# Patient Record
Sex: FEMALE | Race: WHITE | NOT HISPANIC OR LATINO
[De-identification: names, ages, dates, MRNs, and addresses within clinical notes are randomized per-mention and may not be internally consistent; named-entity substitution may affect disease eponyms.]

---

## 2020-06-30 ENCOUNTER — FORM ENCOUNTER (OUTPATIENT)
Age: 50
End: 2020-06-30

## 2020-08-19 ENCOUNTER — FORM ENCOUNTER (OUTPATIENT)
Age: 50
End: 2020-08-19

## 2021-05-10 ENCOUNTER — RX RENEWAL (OUTPATIENT)
Age: 51
End: 2021-05-10

## 2021-05-10 PROBLEM — Z00.00 ENCOUNTER FOR PREVENTIVE HEALTH EXAMINATION: Status: ACTIVE | Noted: 2021-05-10

## 2021-07-14 ENCOUNTER — APPOINTMENT (OUTPATIENT)
Dept: BREAST CENTER | Facility: CLINIC | Age: 51
End: 2021-07-14

## 2021-08-02 DIAGNOSIS — Z85.3 PERSONAL HISTORY OF MALIGNANT NEOPLASM OF BREAST: ICD-10-CM

## 2021-08-02 DIAGNOSIS — Z78.9 OTHER SPECIFIED HEALTH STATUS: ICD-10-CM

## 2021-08-02 DIAGNOSIS — Z80.3 FAMILY HISTORY OF MALIGNANT NEOPLASM OF BREAST: ICD-10-CM

## 2021-08-02 RX ORDER — TAMOXIFEN CITRATE 20 MG/1
20 TABLET, FILM COATED ORAL
Refills: 0 | Status: ACTIVE | COMMUNITY

## 2021-08-04 ENCOUNTER — APPOINTMENT (OUTPATIENT)
Dept: BREAST CENTER | Facility: CLINIC | Age: 51
End: 2021-08-04
Payer: COMMERCIAL

## 2021-08-04 VITALS
SYSTOLIC BLOOD PRESSURE: 136 MMHG | WEIGHT: 165 LBS | HEIGHT: 65 IN | BODY MASS INDEX: 27.49 KG/M2 | DIASTOLIC BLOOD PRESSURE: 89 MMHG | HEART RATE: 82 BPM

## 2021-08-04 DIAGNOSIS — Z72.89 OTHER PROBLEMS RELATED TO LIFESTYLE: ICD-10-CM

## 2021-08-04 PROCEDURE — 99213 OFFICE O/P EST LOW 20 MIN: CPT

## 2021-08-04 NOTE — HISTORY OF PRESENT ILLNESS
[FreeTextEntry1] : The patient is a 51-year-old G2, P1 postmenopausal white female of [place] descent. She underwent menarche at age 13 and had her first child at age 39. She has a strong family history of breast cancer with her mother who had breast cancer in her 50s and her maternal great aunt had breast cancer in her 60s. Her paternal grandfather had stomach cancer. The patient was found to have suspicious calcifications in the upper outer aspect of the left breast back in 2011 and underwent a biopsy showing DCIS. 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 The cancer was ER/CA positive and HER-2/gary negative making this a pathologic prognostic stage IA breast cancer. She underwent intraoperative and postoperative radiation and was placed on tamoxifen. She did not require chemotherapy. She underwent BRCA testing in 2011 and was BRCA negative but did not have NAVEEN testing. She comes in for routine follow-up and continues to get yearly mammography and ultrasound.

## 2021-08-04 NOTE — PHYSICAL EXAM
[Normocephalic] : normocephalic [Atraumatic] : atraumatic [EOMI] : extra ocular movement intact [Supple] : supple [No Supraclavicular Adenopathy] : no supraclavicular adenopathy [No Cervical Adenopathy] : no cervical adenopathy [Examined in the supine and seated position] : examined in the supine and seated position [No dominant masses] : no dominant masses in right breast  [No dominant masses] : no dominant masses left breast [No Nipple Retraction] : no left nipple retraction [No Nipple Discharge] : no left nipple discharge [Breast Mass Right Breast ___cm] : no masses [Breast Mass Left Breast ___cm] : no masses [Breast Nipple Inversion] : nipples not inverted [Breast Nipple Retraction] : nipples not retracted [Breast Nipple Fissures] : nipples not fissured [Breast Nipple Flattening] : nipples not flattened [Breast Abnormal Lactation (Galactorrhea)] : no galactorrhea [Breast Abnormal Secretion Bloody Fluid] : no bloody discharge [Breast Abnormal Secretion Serous Fluid] : no serous discharge [Breast Abnormal Secretion Opalescent Fluid] : no milky discharge [No Axillary Lymphadenopathy] : no left axillary lymphadenopathy [No Edema] : no edema [No Rashes] : no rashes [No Ulceration] : no ulceration [de-identified] : On exam, the patient has full C-cup breasts.  She has a well-healed wide excision scar in the upper outer aspect of the left breast.  On palpation, she has typical postop scarring changes secondary to intraoperative and external beam radiation in the left breast but no evidence of recurrence.  She has no suspicious masses in the right breast.  She has no axillary, supraclavicular, or cervical adenopathy. [de-identified] : Status post partial mastectomy and intraoperative and postoperative radiation with no evidence of recurrence [de-identified] : Left breast upper outer quadrant scarring

## 2021-08-04 NOTE — PAST MEDICAL HISTORY
[Postmenopausal] : The patient is postmenopausal [Menarche Age ____] : age at menarche was [unfilled] [Total Preg ___] : G[unfilled] [Live Births ___] : P[unfilled]  [Age At Live Birth ___] : Age at live birth: [unfilled] [Menopause Age____] : age at menopause was [unfilled]

## 2022-03-01 ENCOUNTER — RX RENEWAL (OUTPATIENT)
Age: 52
End: 2022-03-01

## 2022-07-30 ENCOUNTER — TRANSCRIPTION ENCOUNTER (OUTPATIENT)
Age: 52
End: 2022-07-30

## 2022-09-14 ENCOUNTER — NON-APPOINTMENT (OUTPATIENT)
Age: 52
End: 2022-09-14

## 2022-09-14 ENCOUNTER — RESULT REVIEW (OUTPATIENT)
Age: 52
End: 2022-09-14

## 2022-09-15 ENCOUNTER — APPOINTMENT (OUTPATIENT)
Dept: BREAST CENTER | Facility: CLINIC | Age: 52
End: 2022-09-15

## 2022-09-15 VITALS
HEART RATE: 56 BPM | OXYGEN SATURATION: 96 % | WEIGHT: 158 LBS | SYSTOLIC BLOOD PRESSURE: 127 MMHG | DIASTOLIC BLOOD PRESSURE: 74 MMHG | BODY MASS INDEX: 26.29 KG/M2

## 2022-09-15 DIAGNOSIS — R92.2 INCONCLUSIVE MAMMOGRAM: ICD-10-CM

## 2022-09-15 PROCEDURE — 99213 OFFICE O/P EST LOW 20 MIN: CPT

## 2022-09-15 NOTE — ASSESSMENT
[FreeTextEntry1] : The patient is a 52-year-old G2, P1 postmenopausal white female of [place] descent. She underwent menarche at age 13 and had her first child at age 39. She has a strong family history of breast cancer with her mother who had breast cancer in her 50s and her maternal great aunt had breast cancer in her 60s. Her paternal grandfather had stomach cancer. The patient was found to have suspicious calcifications in the upper outer aspect of the left breast back in 2011 and underwent a biopsy showing DCIS. 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 The cancer was ER/[place] positive and HER-2/gary negative making this a pathologic prognostic stage IA breast cancer. She underwent intraoperative and postoperative radiation and was placed on tamoxifen. She did not require chemotherapy. She underwent BRCA testing in 2011 and was BRCA negative but did not have NAVEEN testing.  On exam today, I cannot feel any evidence of recurrence in the left breast.  She underwent her last bilateral mammography and ultrasound which was reviewed from today on September 15, 2022 performed here at [facility] which just showed postsurgical changes in the upper outer aspect of the left breast but I am awaiting the official radiology report.  She was reassured and should follow-up again in 1 year and will be due for her next bilateral mammography and ultrasound in September 2023 and she was given prescriptions.  The patient could be considered for genetic up testing.  She stopped the tamoxifen after 10 years of treatment.  Her gynecologist is considering putting her on Evista and I have no problem with this.

## 2022-09-15 NOTE — REASON FOR VISIT
[Follow-Up: _____] : a [unfilled] follow-up visit [FreeTextEntry1] : The patient comes in with a family history of breast cancer and a personal history of a left breast upper outer quadrant 5 mm moderately differentiated invasive duct cancer diagnosed in the end of December 2011 for which she underwent a left breast partial mastectomy on December 5, 2011 and had 4 negative sentinel lymph nodes and the cancer was ER/[place] positive HER-2/gary negative making this a pathologic prognostic stage IA breast cancer. She underwent intraoperative radiation followed by external beam radiation and was placed on tamoxifen. She did not require chemotherapy. She comes in for routine follow-up.

## 2022-09-15 NOTE — HISTORY OF PRESENT ILLNESS
[FreeTextEntry1] : The patient is a 52-year-old G2, P1 postmenopausal white female of [place] descent. She underwent menarche at age 13 and had her first child at age 39. She has a strong family history of breast cancer with her mother who had breast cancer in her 50s and her maternal great aunt had breast cancer in her 60s. Her paternal grandfather had stomach cancer. The patient was found to have suspicious calcifications in the upper outer aspect of the left breast back in 2011 and underwent a biopsy showing DCIS. 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 The cancer was ER/NM positive and HER-2/gary negative making this a pathologic prognostic stage IA breast cancer. She underwent intraoperative and postoperative radiation and was placed on tamoxifen. She did not require chemotherapy. She underwent BRCA testing in 2011 and was BRCA negative but did not have NAVEEN testing. She comes in for routine follow-up and continues to get yearly mammography and ultrasound.

## 2022-09-15 NOTE — PHYSICAL EXAM
[Normocephalic] : normocephalic [Atraumatic] : atraumatic [EOMI] : extra ocular movement intact [Supple] : supple [No Supraclavicular Adenopathy] : no supraclavicular adenopathy [No Cervical Adenopathy] : no cervical adenopathy [Examined in the supine and seated position] : examined in the supine and seated position [No dominant masses] : no dominant masses in right breast  [No dominant masses] : no dominant masses left breast [No Nipple Retraction] : no left nipple retraction [No Nipple Discharge] : no left nipple discharge [Breast Mass Right Breast ___cm] : no masses [Breast Mass Left Breast ___cm] : no masses [No Axillary Lymphadenopathy] : no left axillary lymphadenopathy [No Edema] : no edema [No Rashes] : no rashes [No Ulceration] : no ulceration [Breast Nipple Inversion] : nipples not inverted [Breast Nipple Retraction] : nipples not retracted [Breast Nipple Flattening] : nipples not flattened [Breast Nipple Fissures] : nipples not fissured [Breast Abnormal Lactation (Galactorrhea)] : no galactorrhea [Breast Abnormal Secretion Bloody Fluid] : no bloody discharge [Breast Abnormal Secretion Serous Fluid] : no serous discharge [Breast Abnormal Secretion Opalescent Fluid] : no milky discharge [de-identified] : On exam, the patient has full C-cup breasts.  She has a well-healed wide excision scar in the upper outer aspect of the left breast.  On palpation, she has typical postop scarring changes secondary to intraoperative and external beam radiation in the left breast but no evidence of recurrence.  She has no suspicious masses in the right breast.  She has no axillary, supraclavicular, or cervical adenopathy. [de-identified] : Status post partial mastectomy and intraoperative and postoperative radiation with no evidence of recurrence [de-identified] : Left breast upper outer quadrant scarring

## 2022-09-19 ENCOUNTER — NON-APPOINTMENT (OUTPATIENT)
Age: 52
End: 2022-09-19

## 2022-11-30 ENCOUNTER — RX RENEWAL (OUTPATIENT)
Age: 52
End: 2022-11-30

## 2022-11-30 RX ORDER — TAMOXIFEN CITRATE 20 MG/1
20 TABLET, FILM COATED ORAL
Qty: 90 | Refills: 2 | Status: ACTIVE | COMMUNITY
Start: 2021-05-10 | End: 1900-01-01

## 2023-10-12 ENCOUNTER — RESULT REVIEW (OUTPATIENT)
Age: 53
End: 2023-10-12

## 2023-10-19 ENCOUNTER — APPOINTMENT (OUTPATIENT)
Dept: BREAST CENTER | Facility: CLINIC | Age: 53
End: 2023-10-19
Payer: COMMERCIAL

## 2023-10-19 VITALS
BODY MASS INDEX: 27.46 KG/M2 | DIASTOLIC BLOOD PRESSURE: 78 MMHG | OXYGEN SATURATION: 97 % | WEIGHT: 165 LBS | HEART RATE: 74 BPM | SYSTOLIC BLOOD PRESSURE: 131 MMHG

## 2023-10-19 DIAGNOSIS — Z85.3 PERSONAL HISTORY OF MALIGNANT NEOPLASM OF BREAST: ICD-10-CM

## 2023-10-19 DIAGNOSIS — Z90.12 ACQUIRED ABSENCE OF LEFT BREAST AND NIPPLE: ICD-10-CM

## 2023-10-19 DIAGNOSIS — Z80.3 FAMILY HISTORY OF MALIGNANT NEOPLASM OF BREAST: ICD-10-CM

## 2023-10-19 PROCEDURE — 99213 OFFICE O/P EST LOW 20 MIN: CPT

## 2023-12-17 ENCOUNTER — NON-APPOINTMENT (OUTPATIENT)
Age: 53
End: 2023-12-17

## 2024-07-20 ENCOUNTER — NON-APPOINTMENT (OUTPATIENT)
Age: 54
End: 2024-07-20

## 2024-09-25 ENCOUNTER — NON-APPOINTMENT (OUTPATIENT)
Age: 54
End: 2024-09-25

## 2024-09-25 NOTE — PAST MEDICAL HISTORY
Patient oriented x3, forgetful + drowsy at times. Family at bedside. VSS, afebrile this shift. Heparin on hold. Tolerating diet, no nausea. - BM today. Voiding in purewick, depends changed due to leaking. Fentanyl patch on R arm. 1x dose of oral dilaudid given. IV zosyn started, electrolyte replacement. R port. Patient up with 1 assist and RW. Fall precautions in place. Bed locked and in lowest position. Call light within reach. Plan for MRI lumbar possibly tonight, will need xanax prior. Problem: Patient Centered Care  Goal: Patient preferences are identified and integrated in the patient's plan of care  Description: Interventions:  - What would you like us to know as we care for you?   - Provide timely, complete, and accurate information to patient/family  - Incorporate patient and family knowledge, values, beliefs, and cultural backgrounds into the planning and delivery of care  - Encourage patient/family to participate in care and decision-making at the level they choose  - Honor patient and family perspectives and choices  Outcome: Progressing     Problem: Patient/Family Goals  Goal: Patient/Family Long Term Goal  Description: Patient's Long Term Goal:     Interventions:  -   - See additional Care Plan goals for specific interventions  Outcome: Progressing  Goal: Patient/Family Short Term Goal  Description: Patient's Short Term Goal:     Interventions:   -   - See additional Care Plan goals for specific interventions  Outcome: Progressing     Problem: SAFETY ADULT - FALL  Goal: Free from fall injury  Description: INTERVENTIONS:  - Assess pt frequently for physical needs  - Identify cognitive and physical deficits and behaviors that affect risk of falls.   - [place] fall precautions as indicated by assessment.  - Educate pt/family on patient safety including physical limitations  - Instruct pt to call for assistance with activity based on assessment  - Modify environment to reduce risk of injury  - Provide assistive devices as appropriate  - Consider OT/PT consult to assist with strengthening/mobility  - Encourage toileting schedule  Outcome: Progressing     Problem: DISCHARGE PLANNING  Goal: Discharge to home or other facility with appropriate resources  Description: INTERVENTIONS:  - Identify barriers to discharge w/pt and caregiver  - Include patient/family/discharge partner in discharge planning  - Arrange for needed discharge resources and transportation as appropriate  - Identify discharge learning needs (meds, wound care, etc)  - Arrange for interpreters to assist at discharge as needed  - Consider post-discharge preferences of patient/family/discharge partner  - Complete POLST form as appropriate  - Assess patient's ability to be responsible for managing their own health  - Refer to Case Management Department for coordinating discharge planning if the patient needs post-hospital services based on physician/LIP order or complex needs related to functional status, cognitive ability or social support system  Outcome: Progressing     Problem: SKIN/TISSUE INTEGRITY - ADULT  Goal: Skin integrity remains intact  Description: INTERVENTIONS  - Assess and document risk factors for pressure ulcer development  - Assess and document skin integrity  - Monitor for areas of redness and/or skin breakdown  - Initiate interventions, skin care algorithm/standards of care as needed  Outcome: Progressing  Goal: Incision(s), wounds(s) or drain site(s) healing without S/S of infection  Description: INTERVENTIONS:  - Assess and document risk factors for pressure ulcer development  - Assess and document skin integrity  - Assess and document dressing/incision, wound bed, drain sites and surrounding tissue  - Implement wound care per orders  - Initiate isolation precautions as appropriate  - Initiate Pressure Ulcer prevention bundle as indicated  Outcome: Progressing     Problem: MUSCULOSKELETAL - ADULT  Goal: Return mobility to safest [Postmenopausal] : The patient is postmenopausal level of function  Description: INTERVENTIONS:  - Assess patient stability and activity tolerance for standing, transferring and ambulating w/ or w/o assistive devices  - Assist with transfers and ambulation using safe patient handling equipment as needed  - Ensure adequate protection for wounds/incisions during mobilization  - Obtain PT/OT consults as needed  - Advance activity as appropriate  - Communicate ordered activity level and limitations with patient/family  Outcome: Progressing     Problem: NEUROLOGICAL - ADULT  Goal: Achieves stable or improved neurological status  Description: INTERVENTIONS  - Assess for and report changes in neurological status  - Initiate measures to prevent increased intracranial pressure  - Maintain blood pressure and fluid volume within ordered parameters to optimize cerebral perfusion and minimize risk of hemorrhage  - Monitor temperature, glucose, and sodium.  Initiate appropriate interventions as ordered  Outcome: Progressing [Menarche Age ____] : age at menarche was [unfilled] [Menopause Age____] : age at menopause was [unfilled] [Total Preg ___] : G[unfilled] [Live Births ___] : P[unfilled]  [Age At Live Birth ___] : Age at live birth: [unfilled]

## 2024-09-25 NOTE — HISTORY OF PRESENT ILLNESS
[FreeTextEntry1] : The patient is a 54-year-old G2, P1 postmenopausal white female of [place] descent. She underwent menarche at age 13 and had her first child at age 39. She has a strong family history of breast cancer with her mother who had breast cancer in her 50s and her maternal great aunt had breast cancer in her 60s. Her paternal grandfather had stomach cancer. The patient was found to have suspicious calcifications in the upper outer aspect of the left breast back in 2011 and underwent a biopsy showing DCIS. 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 The cancer was ER/[place] positive and HER-2/gary negative making this a pathologic prognostic stage IA breast cancer. She underwent intraoperative and postoperative radiation and was placed on tamoxifen which she took for a full 10 years and stopped. She did not require chemotherapy. She underwent BRCA testing in 2011 and was BRCA negative but did not have NAVEEN testing. She comes in for routine follow-up and continues to get yearly mammography and ultrasound.

## 2024-09-25 NOTE — PHYSICAL EXAM
[Normocephalic] : normocephalic [Atraumatic] : atraumatic [EOMI] : extra ocular movement intact [Supple] : supple [No Supraclavicular Adenopathy] : no supraclavicular adenopathy [No Cervical Adenopathy] : no cervical adenopathy [Examined in the supine and seated position] : examined in the supine and seated position [No dominant masses] : no dominant masses in right breast  [No dominant masses] : no dominant masses left breast [No Nipple Retraction] : no left nipple retraction [No Nipple Discharge] : no left nipple discharge [Breast Mass Right Breast ___cm] : no masses [Breast Mass Left Breast ___cm] : no masses [Breast Nipple Inversion] : nipples not inverted [Breast Nipple Retraction] : nipples not retracted [Breast Nipple Flattening] : nipples not flattened [Breast Nipple Fissures] : nipples not fissured [Breast Abnormal Lactation (Galactorrhea)] : no galactorrhea [Breast Abnormal Secretion Bloody Fluid] : no bloody discharge [Breast Abnormal Secretion Serous Fluid] : no serous discharge [Breast Abnormal Secretion Opalescent Fluid] : no milky discharge [No Axillary Lymphadenopathy] : no left axillary lymphadenopathy [No Edema] : no edema [No Rashes] : no rashes [No Ulceration] : no ulceration [de-identified] : On exam, the patient has full C-cup breasts.  She has a well-healed wide excision scar in the upper outer aspect of the left breast.  On palpation, she has typical postop scarring changes secondary to intraoperative and external beam radiation in the left breast but no evidence of recurrence.  She has no suspicious masses in the right breast.  She has no axillary, supraclavicular, or cervical adenopathy. [de-identified] : Status post partial mastectomy and intraoperative and postoperative radiation with no evidence of recurrence [de-identified] : Left breast upper outer quadrant scarring

## 2024-09-25 NOTE — ASSESSMENT
[FreeTextEntry1] : The patient is a 54-year-old G2, P1 postmenopausal white female of [place] descent. She underwent menarche at age 13 and had her first child at age 39. She has a strong family history of breast cancer with her mother who had breast cancer in her 50s and her maternal great aunt had breast cancer in her 60s. Her paternal grandfather had stomach cancer. The patient was found to have suspicious calcifications in the upper outer aspect of the left breast back in 2011 and underwent a biopsy showing DCIS. 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 The cancer was ER/[place] positive and HER-2/gary negative making this a pathologic prognostic stage IA breast cancer. She underwent intraoperative and postoperative radiation and was placed on tamoxifen. She did not require chemotherapy. She underwent BRCA testing in 2011 and was BRCA negative but did not have NAVEEN testing. On exam today, I cannot feel any evidence of recurrence in the left breast. She underwent her last bilateral mammography and ultrasound which was reviewed from ?????? October 13, 2023 performed here at [facility] which just showed postsurgical changes in the upper outer aspect of the left breast. She was reassured and should follow-up again in 1 year and will be due for her next bilateral mammography and ultrasound in ???????? October 2025 and she was given prescriptions. The patient could be considered for genetic up testing and she has agreed and we will have the genetic counselor reach out to her ?????? was it performed ??????. She stopped the tamoxifen after 10 years of treatment. Her gynecologist was considering putting her on Evista but she has not started yet.

## 2024-09-25 NOTE — REASON FOR VISIT
[Follow-Up: _____] : a [unfilled] follow-up visit [FreeTextEntry1] : The patient comes in with a family history of breast cancer and a personal history of a left breast upper outer quadrant 5 mm moderately differentiated invasive duct cancer diagnosed in the end of December 2011 for which she underwent a left breast partial mastectomy on December 5, 2011 and had 4 negative sentinel lymph nodes and the cancer was ER/[place] positive HER-2/gary negative making this a pathologic prognostic stage IA breast cancer. She underwent intraoperative radiation followed by external beam radiation and was placed on tamoxifen. She did not require chemotherapy. She comes in for routine follow-up. [FreeTextEntry2] : December 5, 2011

## 2024-10-17 ENCOUNTER — APPOINTMENT (OUTPATIENT)
Dept: BREAST CENTER | Facility: CLINIC | Age: 54
End: 2024-10-17
Payer: COMMERCIAL

## 2024-10-17 ENCOUNTER — RESULT REVIEW (OUTPATIENT)
Age: 54
End: 2024-10-17

## 2024-10-17 VITALS
DIASTOLIC BLOOD PRESSURE: 74 MMHG | OXYGEN SATURATION: 98 % | SYSTOLIC BLOOD PRESSURE: 125 MMHG | WEIGHT: 175 LBS | HEIGHT: 65 IN | BODY MASS INDEX: 29.16 KG/M2 | HEART RATE: 63 BPM

## 2024-10-17 DIAGNOSIS — Z85.3 PERSONAL HISTORY OF MALIGNANT NEOPLASM OF BREAST: ICD-10-CM

## 2024-10-17 DIAGNOSIS — Z80.3 FAMILY HISTORY OF MALIGNANT NEOPLASM OF BREAST: ICD-10-CM

## 2024-10-17 DIAGNOSIS — Z90.12 ACQUIRED ABSENCE OF LEFT BREAST AND NIPPLE: ICD-10-CM

## 2024-10-17 PROCEDURE — 99213 OFFICE O/P EST LOW 20 MIN: CPT

## 2024-11-14 ENCOUNTER — NON-APPOINTMENT (OUTPATIENT)
Age: 54
End: 2024-11-14

## 2025-01-31 ENCOUNTER — APPOINTMENT (OUTPATIENT)
Dept: HEMATOLOGY ONCOLOGY | Facility: CLINIC | Age: 55
End: 2025-01-31

## 2025-09-12 ENCOUNTER — NON-APPOINTMENT (OUTPATIENT)
Age: 55
End: 2025-09-12